# Patient Record
Sex: MALE | Race: WHITE | Employment: FULL TIME | URBAN - METROPOLITAN AREA
[De-identification: names, ages, dates, MRNs, and addresses within clinical notes are randomized per-mention and may not be internally consistent; named-entity substitution may affect disease eponyms.]

---

## 2020-06-28 ENCOUNTER — HOSPITAL ENCOUNTER (EMERGENCY)
Facility: CLINIC | Age: 60
Discharge: HOME | End: 2020-06-28
Attending: EMERGENCY MEDICINE | Admitting: EMERGENCY MEDICINE
Payer: COMMERCIAL

## 2020-06-28 VITALS
RESPIRATION RATE: 12 BRPM | TEMPERATURE: 98 F | DIASTOLIC BLOOD PRESSURE: 75 MMHG | OXYGEN SATURATION: 96 % | SYSTOLIC BLOOD PRESSURE: 111 MMHG | HEART RATE: 68 BPM

## 2020-06-28 DIAGNOSIS — T15.90XA FOREIGN BODY IN EYE, UNSPECIFIED LATERALITY, INITIAL ENCOUNTER: ICD-10-CM

## 2020-06-28 PROCEDURE — 25000125 ZZHC RX 250: Performed by: EMERGENCY MEDICINE

## 2020-06-28 PROCEDURE — 99283 EMERGENCY DEPT VISIT LOW MDM: CPT | Performed by: EMERGENCY MEDICINE

## 2020-06-28 PROCEDURE — 99284 EMERGENCY DEPT VISIT MOD MDM: CPT | Mod: Z6 | Performed by: EMERGENCY MEDICINE

## 2020-06-28 RX ORDER — POLYMYXIN B SULFATE AND TRIMETHOPRIM 1; 10000 MG/ML; [USP'U]/ML
1-2 SOLUTION OPHTHALMIC EVERY 4 HOURS
Qty: 3 ML | Refills: 0 | Status: SHIPPED | OUTPATIENT
Start: 2020-06-28 | End: 2020-07-03

## 2020-06-28 RX ORDER — TETRACAINE HYDROCHLORIDE 5 MG/ML
2 SOLUTION OPHTHALMIC EVERY 5 MIN PRN
Status: DISCONTINUED | OUTPATIENT
Start: 2020-06-28 | End: 2020-06-28 | Stop reason: HOSPADM

## 2020-06-28 RX ADMIN — TETRACAINE HYDROCHLORIDE 2 DROP: 5 SOLUTION OPHTHALMIC at 14:18

## 2020-06-28 NOTE — ED PROVIDER NOTES
History     Chief Complaint   Patient presents with     Foreign Body in Eye     HPI  Sarath Estrada is a 59 year old male who Presents to the ER secondary to right side eye discomfort. His symptoms include painful and teary.  This started 2-3 hours prior to arrival.  At the time he was drilling in wood above his head.  The discomfort has been constant and feels like he may have wood in the eye.  He tried flushing it to resolve the discomfort. He does notwear contact lenses. He says that subjectively the vision is:  No change.  No recent uri symptoms, eye crusting.            Allergies:  Allergies   Allergen Reactions     Contrast Dye Hives       Problem List:    Patient Active Problem List    Diagnosis Date Noted     Neck pain 03/14/2012     Priority: Medium        Past Medical History:    Past Medical History:   Diagnosis Date     Gastro-oesophageal reflux disease      Hyperlipemia      Hypertension      Thyroid disease        Past Surgical History:    Past Surgical History:   Procedure Laterality Date     BACK SURGERY      l5     ORTHOPEDIC SURGERY      right hand and knee       Family History:    No family history on file.    Social History:  Marital Status:  Single [1]  Social History     Tobacco Use     Smoking status: Current Some Day Smoker     Tobacco comment: occassional cigar   Substance Use Topics     Alcohol use: Yes     Comment: rarely     Drug use: No        Medications:    trimethoprim-polymyxin b (POLYTRIM) 12102-9.1 UNIT/ML-% ophthalmic solution  Levothyroxine Sodium (SYNTHROID PO)  LISINOPRIL PO  OMEPRAZOLE PO  SIMVASTATIN PO          Review of Systems   All other systems reviewed and are negative.      Physical Exam   BP: 111/75  Pulse: 68  Temp: 98  F (36.7  C)  Resp: 12  SpO2: 96 %      Physical Exam  Vitals signs and nursing note reviewed.   Constitutional:       General: He is not in acute distress.     Appearance: He is well-developed. He is not diaphoretic.   HENT:      Head: Normocephalic  and atraumatic.   Eyes:      General: No scleral icterus.     Extraocular Movements: Extraocular movements intact.      Conjunctiva/sclera: Conjunctivae normal.      Pupils: Pupils are equal, round, and reactive to light.      Comments: No photophobia, no foreign body identified, no corneal abrasion noted on fluorescein staining exam.    Neck:      Musculoskeletal: Normal range of motion and neck supple.   Cardiovascular:      Rate and Rhythm: Normal rate.   Pulmonary:      Effort: Pulmonary effort is normal.   Musculoskeletal: Normal range of motion.   Skin:     General: Skin is warm and dry.      Findings: No rash.   Neurological:      General: No focal deficit present.      Mental Status: He is alert and oriented to person, place, and time.         ED Course        Procedures             Pt had almost complete relief prior to the tetracaine, it was resolving.  Tetracaine placed for eye exam and staining, no pain after. No fb found.     No results found for this or any previous visit (from the past 24 hour(s)).    Medications   tetracaine (PONTOCAINE) 0.5 % ophthalmic solution 2 drop (has no administration in time range)       Assessments & Plan (with Medical Decision Making)  F/b sensation right eye, no distinct corneal abrasion. Will give poltrim drops. The diagnosis, treatment options, risks and follow-up discussed and all questions answered.       I have reviewed the nursing notes.    I have reviewed the findings, diagnosis, plan and need for follow up with the patient.      New Prescriptions    TRIMETHOPRIM-POLYMYXIN B (POLYTRIM) 24015-0.1 UNIT/ML-% OPHTHALMIC SOLUTION    Place 1-2 drops into the right eye every 4 hours for 5 days       Final diagnoses:   Foreign body in eye, unspecified laterality, initial encounter       6/28/2020   [facility] EMERGENCY DEPARTMENT     Stuart Lima MD  06/28/20 [id number]

## 2020-06-28 NOTE — ED AVS SNAPSHOT
[facility] Emergency Department  [address] [place] [address]  Phone:  [phone]  Fax:  [phone]                                    Sarath Estrada   MRN: [id number]    Department:  [facility] Emergency Department   Date of Visit:  6/28/2020           After Visit Summary Signature Page    I have received my discharge instructions, and my questions have been answered. I have discussed any challenges I see with this plan with the nurse or doctor.    ..........................................................................................................................................  Patient/Patient Representative Signature      ..........................................................................................................................................  Patient Representative Print Name and Relationship to Patient    ..................................................               ................................................  Date                                   Time    ..........................................................................................................................................  Reviewed by Signature/Title    ...................................................              ..............................................  Date                                               Time          22EPIC Rev 08/18

## 2020-06-28 NOTE — DISCHARGE INSTRUCTIONS
Please return to the ER if new or worsening symptoms for re-evaluation. I hope you get better quickly. If no improvement see your eye doctor.

## 2020-06-28 NOTE — ED NOTES
Pt states that he was hanging ceiling tile and drilling in an upward direction when something got into his eye.  Pt unsure of what.  He states that he tried to flush it out at home, but it started to have an irritating feeling.  Pt states that the discomfort is improved if he holds the right upper eyelid up.